# Patient Record
Sex: FEMALE | Race: WHITE
[De-identification: names, ages, dates, MRNs, and addresses within clinical notes are randomized per-mention and may not be internally consistent; named-entity substitution may affect disease eponyms.]

---

## 2017-04-02 NOTE — EDM.PDOC
ED HISTORY OF PRESENT ILLNESS





- General


Chief Complaint: Respiratory Problem


Stated Complaint: COLD/CONGESTION


Time Seen by Provider: 04/02/17 17:50


Source of Information: Reports: Patient


History Limitations: Reports: No limitations





- History of Present Illness


INITIAL COMMENTS - FREE TEXT/NARRATIVE: 


History of present illness:


[31-old female comes in with complaints of worsening cold symptoms. Indicates 

that starting Friday she became very congested had a fever as well as a 

worsening cough. Indicates last night she was kept with the cough it was 

working gave her headache and now she feels like she is having trouble with 

breathing.]





Review of systems: 


As per history of present illness and below otherwise all systems reviewed and 

negative.





Past medical history: 


As per history of present illness and as reviewed below otherwise 

noncontributory.





Surgical history: 


As per history of present illness and as reviewed below otherwise 

noncontributory.





Social history: 


No reported history of drug or alcohol abuse.





Family history: 


As per history of present illness and as reviewed below otherwise 

noncontributory.





Physical exam:


HEENT: Atraumatic, normocephalic, pupils reactive, negative for conjunctival 

pallor or scleral icterus, mucous membranes moist, throat clear, neck supple, 

nontender, trachea midline.


Lungs: Inspiratory wheezes bilaterally, generalized rhonchi noted but improves 

with the cough,  chest nontender.


Heart: S1S2, regular, negative for clicks, rubs, or JVD.


Abdomen: Soft, nondistended, nontender. Negative for masses or 

hepatosplenomegaly. Negative for costovertebral tenderness.


Pelvis: Stable nontender.


Genitourinary: Deferred.


Rectal: Deferred.


Extremities: Atraumatic, negative for cords or calf pain. Neurovascular 

unremarkable.


Neuro: Awake, alert, oriented. Cranial nerves II through XII unremarkable. 

Cerebellum unremarkable. Motor and sensory unremarkable throughout. Exam 

nonfocal.





Diagnostics:


[CBC, CMP, chest x-ray, influenza A B.]





Therapeutics:


[The fluid, Toradol, DuoNeb]





Impression: 


[Bronchitis, bacterial pharyngitis]





Plan:


[Inhaler, and zpack, ]





Definitive disposition and diagnosis as appropriate pending reevaluation and 

review of above.











- Related Data


Allergies/ADRs: 


 Allergies











Allergy/AdvReac Type Severity Reaction Status Date / Time


 


No Known Allergies Allergy   Verified 04/02/17 17:29











Home Meds: 


 Home Meds





Citalopram [Celexa] 20 mg PO DAILY 04/02/17 [History]











Past Medical History





- Past Health History


Medical/Surgical History: Denies Medical/Surgical History


HEENT History: Reports: None


Cardiovascular History: Reports: None


Respiratory History: Reports: None


Gastrointestinal History: Reports: None


Genitourinary History: Reports: None


OB/GYN History: Reports: None


Musculoskeletal History: Reports: None


Neurological History: Reports: None


Psychiatric History: Reports: Anxiety, Depression


Endocrine/Metabolic History: Reports: Obesity/BMI 30+


Hematologic History: Reports: None


Immunologic History: Reports: None


Oncologic (Cancer) History: Reports: None


Dermatologic History: Reports: None





- Infectious Disease History


Infectious Disease History: Reports: None





- Past Surgical History


Head Surgeries/Procedures: Reports: None


Cardiovascular Surgical History: Reports: None


Respiratory Surgical History: Reports: None


GI Surgical History: Reports: None


Female  Surgical History: Reports: None


Musculoskeletal Surgical History: Reports: None





Social & Family History





- Family History


Family Medical History: Noncontributory





- Tobacco Use


Smoking Status *Q: Current Every Day Smoker


Years of Tobacco use: 10


Packs/Tins Daily: 0.5


Second Hand Smoke Exposure: Yes





- Caffeine Use


Caffeine Use: Reports: Energy drinks





- Alcohol Use


Days Per Week of Alcohol Use: 2


Number of Drinks Per Day: 2


Total Drinks Per Week: 4





- Recreational Drug Use


Recreational Drug Use: No





ED ROS GENERAL





- Review of Systems


Review Of Systems: See Below (See history of present illness)





ED EXAM, GENERAL





- Physical Exam


Exam: See Below (See history of present illness)





Course





- Vital Signs


Last Recorded V/S: 


 Last Vital Signs











Temp  36.5 C   04/02/17 17:30


 


Pulse  86   04/02/17 17:30


 


Resp  16   04/02/17 17:30


 


BP  116/72   04/02/17 17:30


 


Pulse Ox  96   04/02/17 17:30














- Orders/Labs/Meds


Orders: 


 Active Orders 24 hr











 Category Date Time Status


 


 RT Aerosol Therapy [RC] ASDIRECTED Care  04/02/17 18:00 Active


 


 Chest 2V [CR] Stat Exams  04/02/17 18:08 Taken











Labs: 


 Laboratory Tests











  04/02/17 04/02/17 Range/Units





  18:22 18:22 


 


WBC  12.07 H   (4.0-11.0)  K/uL


 


RBC  4.19 L   (4.30-5.90)  M/uL


 


Hgb  13.3   (12.0-16.0)  g/dL


 


Hct  39.5   (36.0-46.0)  %


 


MCV  94.3   (80.0-98.0)  fL


 


MCH  31.7   (27.0-32.0)  pg


 


MCHC  33.7   (31.0-37.0)  g/dL


 


RDW Std Deviation  47.7   (28.0-62.0)  fl


 


RDW Coeff of Margarita  14   (11.0-15.0)  %


 


Plt Count  267   (150-400)  K/uL


 


MPV  10.10   (7.40-12.00)  fL


 


Neut % (Auto)  63.5   (48.0-80.0)  %


 


Lymph % (Auto)  26.4   (16.0-40.0)  %


 


Mono % (Auto)  6.3   (0.0-15.0)  %


 


Eos % (Auto)  3.6   (0.0-7.0)  %


 


Baso % (Auto)  0.2   (0.0-1.5)  %


 


Neut # (Auto)  7.7 H   (1.4-5.7)  K/uL


 


Lymph # (Auto)  3.2 H   (0.6-2.4)  K/uL


 


Mono # (Auto)  0.8   (0.0-0.8)  K/uL


 


Eos # (Auto)  0.4   (0.0-0.7)  K/uL


 


Baso # (Auto)  0.0   (0.0-0.1)  K/uL


 


Nucleated RBC %  0.0   /100WBC


 


Nucleated RBCs #  0   K/uL


 


Sodium   141  (136-146)  mmol/L


 


Potassium   4.1  (3.5-5.1)  mmol/L


 


Chloride   113 H  ()  mmol/L


 


Carbon Dioxide   19 L  (21-31)  mmol/L


 


BUN   15  (6.0-23.0)  mg/dL


 


Creatinine   0.7  (0.6-1.5)  mg/dL


 


Est Cr Clr Drug Dosing   123.93  mL/min


 


Estimated GFR (MDRD)   > 60.0  ml/min


 


Glucose   96  ()  mg/dL


 


Calcium   8.7 L  (8.8-10.8)  mg/dL


 


Total Bilirubin   0.2  (0.1-1.5)  mg/dL


 


AST   15  (5-40)  IU/L


 


ALT   12  (8-54)  IU/L


 


Alkaline Phosphatase   74  ()  


 


Total Protein   7.2  (6.0-8.0)  g/dL


 


Albumin   3.8  (3.5-5.0)  g/dL


 


Globulin   3.4  (2.0-3.5)  g/dL


 


Albumin/Globulin Ratio   1.1 L  (1.3-2.8)  











Meds: 


Medications














Discontinued Medications














Generic Name Dose Route Start Last Admin





  Trade Name Freq  PRN Reason Stop Dose Admin


 


Albuterol/Ipratropium  3 ml  04/02/17 18:00  04/02/17 18:08





  Duoneb 3.0-0.5 Mg/3 Ml  NEB  04/02/17 18:01  3 ml





  ONETIME ONE   Administration


 


Sodium Chloride  1,000 mls @ 999 mls/hr  04/02/17 18:00  04/02/17 18:13





  Normal Saline  IV  04/02/17 19:00  999 mls/hr





  STAT ONE   Administration


 


Ketorolac Tromethamine  30 mg  04/02/17 18:00  04/02/17 18:13





  Toradol  IVPUSH  04/02/17 18:01  Not Given





  ONETIME ONE   














Departure





- Departure


Time of Disposition: 19:25


Disposition: Home, Self-Care 01


Condition: good


Clinical Impression: 


 Bronchitis





Pharyngitis


Qualifiers:


 Pharyngitis/tonsillitis etiology: unspecified etiology Qualified Code(s): 

J02.9 - Acute pharyngitis, unspecified





Instructions:  Upper Respiratory Infection, Adult, Easy-to-Read, Acute 

Bronchitis, Easy-to-Read


Forms:  ED Department Discharge


Additional Instructions: 


The following information is given to patients seen in the emergency department 

who are being discharged to home. This information is to outline your options 

for follow-up care. We provide all patients seen in our emergency department 

with a follow-up referral.





The need for follow-up, as well as the timing and circumstances, are variable 

depending upon the specifics of your emergency department visit.





If you don't have a primary care physician on staff, we will provide you with a 

referral. We always advise you to contact your personal physician following an 

emergency department visit to inform them of the circumstance of the visit and 

for follow-up with them and/or the need for any referrals to a consulting 

specialist.





The emergency department will also refer you to a specialist when appropriate. 

This referral assures that you have the opportunity for follow-up care with a 

specialist. All of these measure are taken in an effort to provide you with 

optimal care, which includes your follow-up.





Under all circumstances we always encourage you to contact your private 

physician who remains a resource for coordinating your care. When calling for 

follow-up care, please make the office aware that this follow-up is from your 

recent emergency room visit. If for any reason you are refused follow-up, 

please contact the [facility] Emergency 

Department at [phone] and asked to speak to the emergency department 

charge nurse.





Take medication as directed








Hydrate








Followup with the primary care provider one to 2 days














Return to ED as needed as discussed





- My Orders


Last 24 Hours: 


My Active Orders





04/02/17 18:00


RT Aerosol Therapy [RC] ASDIRECTED 





04/02/17 18:08


Chest 2V [CR] Stat 














- Assessment/Plan


Last 24 Hours: 


My Active Orders





04/02/17 18:00


RT Aerosol Therapy [RC] ASDIRECTED 





04/02/17 18:08


Chest 2V [CR] Stat

## 2017-04-03 NOTE — CR
EXAM DATE: 17



PATIENT'S AGE: 31



Patient: OLIVER NYE



Facility: [place], [place]

Patient ID: [id number]

Site Patient ID: [id number].

Site Accession #: [id number]

: 1985

Study: XRay Chest [id number]/2/2017 6:50:26 PM

Ordering Physician: Doctor Temp



Final Report: 

INDICATIONS:

Cough x2 days.



TECHNIQUE:

Chest 2 view.



COMPARISON:

None



FINDINGS:

No pneumothorax, pleural effusion or airspace consolidation. Cardiac and 
mediastinal contours are within normal limits. Upper abdomen and osseous 
structures show no acute abnormality.



IMPRESSION:

No acute cardiopulmonary disease.



Dictated by Jerel Sifuentes MD @ 2017 7:08:28 PM



Dictated by: Jerel Sifuentes MD @ 2017 19:08:34

(Electronic Signature)



Report Signed by Proxy and Original Signed Document filed in the

Medical Record.
MARJORIE

## 2018-09-04 NOTE — EDM.PDOC
ED HPI GENERAL MEDICAL PROBLEM





- General


Chief Complaint: Upper Extremity Injury/Pain


Stated Complaint: LT ARM FEELS FUNNY


Time Seen by Provider: 09/04/18 17:40


Source of Information: Reports: Patient


History Limitations: Reports: No Limitations





- History of Present Illness


INITIAL COMMENTS - FREE TEXT/NARRATIVE: 


HISTORY AND PHYSICAL:





History of present illness:


Patient is a 33-year-old female who presents to the emergency room with 

complaints of numbness and tingling to her left arm. She states that over the 

past 3 days she has had intermittent episodes of numbness, tingling and "feels 

heavy". She states that this sensation feels like she has a bruise when 

palpated to her forearm. Sensation starts at the deltoid and goes down to the 

finger tips. Has had some intermitted headaches, but currently denies HA.





She denies any recent injury, trauma or falls. Patient denies any fever, chills

, change in vision, syncope or near syncope. Denies any chest pain, shortness 

of breath or cough. Denies any GI or  symptoms. She denies any repetitive arm 

motion or heavy lifting. Right hand dominant.





Review of systems: 


As per history of present illness and below otherwise all systems reviewed and 

negative.





Past medical history: 


As per history of present illness and as reviewed below otherwise 

noncontributory.





Surgical history: 


As per history of present illness and as reviewed below otherwise 

noncontributory.





Social history: 


No reported history of drug or alcohol abuse.





Family history: 


As per history of present illness and as reviewed below otherwise 

noncontributory.





Physical exam:


General: Well-developed and well-nourished 33-year-old female. Alert and 

oriented. Nontoxic appearing and in no acute distress.


HEENT: Atraumatic, normocephalic, pupils equal and reactive bilaterally, 

negative for conjunctival pallor or scleral icterus, mucous membranes moist, 

throat clear, neck supple, nontender, trachea midline. No drooling or trismus 

noted. No meningeal signs


Lungs: Clear to auscultation, breath sounds equal bilaterally, chest nontender.


Heart: S1S2, regular rate and rhythm without overt murmur


Abdomen: Soft, nondistended, nontender. Negative for masses or 

hepatosplenomegaly. Negative for costovertebral tenderness.


Pelvis: Stable nontender.


Genitourinary: Deferred.


Rectal: Deferred.


Skin: Intact, warm, dry. No lesions or rashes noted.


Extremities: Atraumatic, moves all extremities per self without difficulty or 

deficits. Equal strength and grasping to upper extremities. negative for cords 

or calf pain. Strong radial pulses bilaterally. Cap refill less than 3 seconds. 

+CMS. fully ambulatory with steady and even gait. Able to walk on heels and 

toes without difficulty. Neurovascular unremarkable. Negative Phallens sign.


C-spine/Back: No pinpoint vertebral tenderness upon palpation. No crepitus, step

-offs or obvious deformities.


Neuro: Awake, alert, oriented. Cranial nerves II through XII unremarkable. 

Denies any urinary or fecal incontinence. Cerebellum unremarkable. Motor and 

sensory unremarkable throughout. Exam nonfocal.





Notes:


GCS 15. NIH 0. Head and C-spine CT are within normal limits. Vital signs are 

stable. After talking with the patient she does describe this discomfort as a 

paresthesia but also has some muscular component to it. We did discuss muscle 

relaxer Will prescribe a Medrol dose pack and Flexeril. She is given a call Dr. Schuler's office tomorrow for follow-up. She denies any further questions or 

concerns at this time.





Diagnostics:


CT of head and C-spine





Therapeutics:


Solu-Medrol





Prescription:


Medrol Dosepak


Flexeril PRN (#15)


 


Impression: 


Paresthesia


Muscle Strain





Plan:


1. Take medication as directed.  Flexeril, muscle relaxer, may make you drowsy 

so do not take it will driving her needing to be functioning outside the house.


2. Please follow up with your primary care provider, Dr Schuler, in the next 1-2 

days. Return to the ED as needed and as discussed.





Definitive disposition and diagnosis as appropriate pending reevaluation and 

review of above.








- Related Data


 Allergies











Allergy/AdvReac Type Severity Reaction Status Date / Time


 


No Known Allergies Allergy   Verified 09/04/18 17:31











Home Meds: 


 Home Meds





Citalopram [Celexa] 20 mg PO DAILY 04/02/17 [History]


Cyclobenzaprine [Flexeril] 10 mg PO TID PRN #15 tab 09/04/18 [Rx]


methylPREDNISolone [Medrol] 1 tab PO DAILY #1 dosepk 09/04/18 [Rx]











Past Medical History





- Past Health History


Medical/Surgical History: Denies Medical/Surgical History


HEENT History: Reports: None


Cardiovascular History: Reports: None


Respiratory History: Reports: None


Gastrointestinal History: Reports: None


Genitourinary History: Reports: None


OB/GYN History: Reports: None


Musculoskeletal History: Reports: None


Neurological History: Reports: None


Psychiatric History: Reports: Anxiety, Depression


Endocrine/Metabolic History: Reports: Obesity/BMI 30+


Hematologic History: Reports: None


Immunologic History: Reports: None


Oncologic (Cancer) History: Reports: None


Dermatologic History: Reports: None





- Infectious Disease History


Infectious Disease History: Reports: None





- Past Surgical History


Head Surgeries/Procedures: Reports: None


HEENT Surgical History: Reports: None


Cardiovascular Surgical History: Reports: None


Respiratory Surgical History: Reports: None


GI Surgical History: Reports: None


Female  Surgical History: Reports: None


Musculoskeletal Surgical History: Reports: None





Social & Family History





- Family History


Family Medical History: Noncontributory





- Tobacco Use


Smoking Status *Q: Current Every Day Smoker


Years of Tobacco use: 13


Packs/Tins Daily: 1


Second Hand Smoke Exposure: Yes





- Caffeine Use


Caffeine Use: Reports: Energy Drinks





- Recreational Drug Use


Recreational Drug Use: No





Review of Systems





- Review of Systems


Review Of Systems: ROS reveals no pertinent complaints other than HPI.





ED EXAM, GENERAL





- Physical Exam


Exam: See Below (See dictation)





Course





- Vital Signs


Last Recorded V/S: 


 Last Vital Signs











Temp  97.7 F   09/04/18 19:15


 


Pulse  72   09/04/18 19:15


 


Resp  18   09/04/18 19:15


 


BP  115/72   09/04/18 19:15


 


Pulse Ox  98   09/04/18 19:15














- Orders/Labs/Meds


Orders: 


 Active Orders 24 hr











 Category Date Time Status


 


 Cervical Spine wo Cont [CT] Stat Exams  09/04/18 17:47 Taken


 


 Head wo Cont [CT] Stat Exams  09/04/18 17:47 Taken











Meds: 


Medications














Discontinued Medications














Generic Name Dose Route Start Last Admin





  Trade Name Ralphq  PRN Reason Stop Dose Admin


 


Methylprednisolone Sodium Succinate  125 mg  09/04/18 17:47  09/04/18 18:31





  Solu-Medrol  IM  09/04/18 17:48  125 mg





  ONETIME ONE   Administration





     





     





     





     














Departure





- Departure


Time of Disposition: 19:10


Disposition: Home, Self-Care 01


Clinical Impression: 


 Paresthesia of arm, Muscle strain








- Discharge Information


Prescriptions: 


Cyclobenzaprine [Flexeril] 10 mg PO TID PRN #15 tab


 PRN Reason: Muscle Spasm


methylPREDNISolone [Medrol] 1 tab PO DAILY #1 dosepk


Instructions:  Muscle Strain, Easy-to-Read


Referrals: 


PCP,None [Primary Care Provider] - 


Forms:  ED Department Discharge


Additional Instructions: 


The following information is given to patients seen in the emergency department 

who are being discharged to home. This information is to outline your options 

for follow-up care. We provide all patients seen in our emergency department 

with a follow-up referral.





The need for follow-up, as well as the timing and circumstances, are variable 

depending upon the specifics of your emergency department visit.





If you don't have a primary care physician on staff, we will provide you with a 

referral. We always advise you to contact your personal physician following an 

emergency department visit to inform them of the circumstance of the visit and 

for follow-up with them and/or the need for any referrals to a consulting 

specialist.





The emergency department will also refer you to a specialist when appropriate. 

This referral assures that you have the opportunity for follow-up care with a 

specialist. All of these measure are taken in an effort to provide you with 

optimal care, which includes your follow-up.





Under all circumstances we always encourage you to contact your private 

physician who remains a resource for coordinating your care. When calling for 

follow-up care, please make the office aware that this follow-up is from your 

recent emergency room visit. If for any reason you are refused follow-up, 

please contact the [facility] Emergency 

Department at [phone] and asked to speak to the emergency department 

charge nurse.





[facility]


Primary Care


[address] [place] [id number]


Phone: [phone]


Fax: [phone]





1. Take medication as directed.  Flexeril, muscle relaxer, may make you drowsy 

so do not take it while driving or needing to be functioning outside the house.


2. Please follow up with your primary care provider, Dr Schuler, in the next 1-2 

days. Return to the ED as needed and as discussed.





- My Orders


Last 24 Hours: 


My Active Orders





09/04/18 17:47


Cervical Spine wo Cont [CT] Stat 


Head wo Cont [CT] Stat 














- Assessment/Plan


Last 24 Hours: 


My Active Orders





09/04/18 17:47


Cervical Spine wo Cont [CT] Stat 


Head wo Cont [CT] Stat

## 2018-09-05 NOTE — CT
EXAM DATE: 18



PATIENT'S AGE: 33





Patient: OLIVER NYE



Facility: [place], [place]

Patient ID: [id number]

Site Patient ID: [id number].

Site Accession #: [id number].

: 1985

Study: CT Head [id number]/4/2018 6:37:38 PM

Ordering Physician: Doctor Temp



Final Report: 

INDICATION:

Left arm paresthesias.



TECHNIQUE:

CT head without contrast.



COMPARISON:

None. 



FINDINGS:

CSF spaces: Within normal limits for age. 



Brain parenchyma: The gray-white differentiation is normal. No sign of mass, 
hemorrhage, or midline shift. 



Skull base and calvarium: The visualized paranasal sinuses and mastoid air 
cells demonstrate no acute or significant findings. The visualized orbits are 
grossly unremarkable. No skull fractures. 



IMPRESSION:

Unremarkable noncontrast head CT.



Please note that all CT scans at this facility use dose modulation, iterative 
reconstruction, and/or weight-based dosing when appropriate to reduce radiation 
dose to as low as reasonably achievable.



Dictated by Yoav Masters MD @ Sep 4 2018 6:59PM

(Electronic Signature)







Report Signed by Proxy.
[facility]

## 2018-09-05 NOTE — CT
EXAM DATE: 18



PATIENT'S AGE: 33





Patient: OLIVER NYE



Facility: [place], [place]

Patient ID: [id number]

Site Patient ID: [id number].

Site Accession #: [id number]

: 1985

Study: CT Spine Cervical [id number]/4/2018 6:29:30 PM

Ordering Physician: Doctor Temp



Final Report: 

INDICATION:

Left arm paresthesias.



TECHNIQUE:

CT cervical spine without contrast.



COMPARISON:

None



FINDINGS:

Vertebrae: Alignment is normal. There are no fractures or suspicious bony 
lesions. 



Discs and facet joints: Disc spaces and facets are within normal limits. 



Extraspinal findings: Prevertebral soft tissues, visualized airway, and 
visualized lungs are unremarkable. Multiple bilateral mildly prominent lymph 
nodes are present. 



IMPRESSION:

Unremarkable cervical spine CT. No findings to explain left arm paresthesias. 
Mild bilateral neck lymphadenopathy is nonspecific.



Please note that all CT scans at this facility use dose modulation, iterative 
reconstruction, and/or weight-based dosing when appropriate to reduce radiation 
dose to as low as reasonably achievable.



Dictated by Yoav Masters MD @ Sep 4 2018 6:53PM

(Electronic Signature)







Report Signed by Proxy.
[facility]

## 2019-03-12 ENCOUNTER — HOSPITAL ENCOUNTER (EMERGENCY)
Dept: HOSPITAL 56 - MW.ED | Age: 34
Discharge: HOME | End: 2019-03-12
Payer: COMMERCIAL

## 2019-03-12 VITALS — DIASTOLIC BLOOD PRESSURE: 73 MMHG | SYSTOLIC BLOOD PRESSURE: 109 MMHG
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
## 2019-03-12 DIAGNOSIS — L03.312: ICD-10-CM

## 2019-03-12 DIAGNOSIS — S31.000A: Primary | ICD-10-CM

## 2019-03-12 DIAGNOSIS — Z23: ICD-10-CM

## 2019-03-12 NOTE — EDM.PDOC
ED HPI GENERAL MEDICAL PROBLEM





- General


Chief Complaint: Skin Complaint


Stated Complaint: PER PT. SHE HAS SOMETHING ON HER BACK


Time Seen by Provider: 03/12/19 01:14





- History of Present Illness


INITIAL COMMENTS - FREE TEXT/NARRATIVE: 





HISTORY AND PHYSICAL:





History of present illness:


The patient is a healthy 33-year-old female who denies pregnancy and has no 

significant past medical history that she describes to me and presents with 

several days of a wound like area at her left lower back area and she noticed 

some drainage from it and pain and she thought it was looking worse since she 

came for evaluation. The patient does not recall any specific trauma to the 

area or bite and was concerned about infection. She has no systemic complaints 

such as fever chills nausea vomiting or diarrhea. She denies lesions similar to 

this elsewhere on her body.





Review of systems: 


As per history of present illness and below otherwise all systems reviewed and 

negative.





Past medical history: 


As per history of present illness and as reviewed below otherwise 

noncontributory.





Surgical history: 


As per history of present illness and as reviewed below otherwise 

noncontributory.





Social history: 


No reported history of drug or alcohol abuse.





Family history: 


As per history of present illness and as reviewed below otherwise 

noncontributory.





Physical exam:


General: Well-developed well-nourished overweight female who is nontoxic and 

vital signs are reviewed by me


HEENT: Atraumatic, normocephalic,, negative for conjunctival pallor or scleral 

icterus, mucous membranes moist, throat clear, neck supple, nontender, trachea 

midline.


Lungs: Clear to auscultation, breath sounds equal bilaterally, chest nontender.


Heart: S1S2, regular rate and rhythm no overt murmurs


Abdomen: Soft, nondistended, nontender. 


Pelvis: Stable nontender. At the left lumbar spine area there is a circular 

wound measuring 2 x 2 centimeters which has a superficial scab on it and there 

is surrounding erythema with a total measurement of 3.5 x 2.5. There is no 

drainage seen no fluctuance and no soft tissue swelling and there are no other 

lesions seen similar to this in the region.


Genitourinary: Deferred.


Rectal: Deferred.


Extremities: Atraumatic, full range of motion Neurovascular unremarkable.


Neuro: Awake, alert, oriented. Cranial nerves II through XII unremarkable. 

Cerebellum unremarkable. Motor and sensory unremarkable throughout. Exam 

nonfocal.





Diagnostics:








Therapeutics:


Tdap, local wound care with bacitracin and dressing





Impression: 


Lumbar back wound with localized cellulitis





Definitive disposition and diagnosis as appropriate pending reevaluation and 

review of above.


  ** Left Upper Buttock


Pain Score (Numeric/FACES): 0





- Related Data


 Allergies











Allergy/AdvReac Type Severity Reaction Status Date / Time


 


No Known Allergies Allergy   Verified 03/12/19 01:19











Home Meds: 


 Home Meds





Citalopram [Celexa] 20 mg PO DAILY 04/02/17 [History]











Past Medical History





- Past Health History


Medical/Surgical History: Denies Medical/Surgical History


HEENT History: Reports: None


Cardiovascular History: Reports: None


Respiratory History: Reports: None


Gastrointestinal History: Reports: None


Genitourinary History: Reports: None


OB/GYN History: Reports: None


Musculoskeletal History: Reports: None


Neurological History: Reports: None


Psychiatric History: Reports: Anxiety, Depression


Endocrine/Metabolic History: Reports: Obesity/BMI 30+


Hematologic History: Reports: None


Immunologic History: Reports: None


Oncologic (Cancer) History: Reports: None


Dermatologic History: Reports: None





- Infectious Disease History


Infectious Disease History: Reports: None





- Past Surgical History


Head Surgeries/Procedures: Reports: None


HEENT Surgical History: Reports: None


Cardiovascular Surgical History: Reports: None


Respiratory Surgical History: Reports: None


GI Surgical History: Reports: None


Female  Surgical History: Reports: None


Musculoskeletal Surgical History: Reports: None





Social & Family History





- Family History


Family Medical History: Noncontributory





- Caffeine Use


Caffeine Use: Reports: Energy Drinks





ED ROS GENERAL





- Review of Systems


Review Of Systems: ROS reveals no pertinent complaints other than HPI.





ED EXAM, SKIN/RASH


Exam: See Below (See dictation)





Course





- Vital Signs


Last Recorded V/S: 


 Last Vital Signs











Temp  36.3 C   03/12/19 01:17


 


Pulse  80   03/12/19 01:17


 


Resp  18   03/12/19 01:17


 


BP  119/81   03/12/19 01:17


 


Pulse Ox  97   03/12/19 01:17














- Orders/Labs/Meds


Orders: 


 Active Orders 24 hr











 Category Date Time Status


 


 Vaccines to be Administered [RC] PER UNIT ROUTINE Care  03/12/19 01:25 Ordered











Meds: 


Medications














Discontinued Medications














Generic Name Dose Route Start Last Admin





  Trade Name Freq  PRN Reason Stop Dose Admin


 


Bacitracin  1 dose  03/12/19 01:25  





  Bacitracin Oint 1 Gm  TOP  03/12/19 01:26  





  ONETIME ONE   





     





     





     





     


 


Diphtheria/Tetanus/Acell Pertussis  0.5 ml  03/12/19 01:25  





  Adacel  IM  03/12/19 01:26  





  .ONCE ONE   





     





     





     





     














Departure





- Departure


Time of Disposition: 01:29


Disposition: Home, Self-Care 01


Condition: Good


Clinical Impression: 


Cellulitis


Qualifiers:


 Site of cellulitis: other site Qualified Code(s): L03.818 - Cellulitis of 

other sites








- Discharge Information


Referrals: 


PCP,None [Primary Care Provider] - 


Forms:  ED Department Discharge


Additional Instructions: 


The following information is given to patients seen in the emergency department 

who are being discharged to home. This information is to outline your options 

for follow-up care. We provide all patients seen in our emergency department 

with a follow-up referral.





The need for follow-up, as well as the timing and circumstances, are variable 

depending upon the specifics of your emergency department visit.





If you don't have a primary care physician on staff, we will provide you with a 

referral. We always advise you to contact your personal physician following an 

emergency department visit to inform them of the circumstance of the visit and 

for follow-up with them and/or the need for any referrals to a consulting 

specialist.





The emergency department will also refer you to a specialist when appropriate. 

This referral assures that you have the opportunity for followup care with a 

specialist. All of these measure are taken in an effort to provide you with 

optimal care, which includes your followup.





Under all circumstances we always encourage you to contact your private 

physician who remains a resource for coordinating  your care. When calling for 

followup care, please make the office aware that this follow-up is from your 

recent emergency room visit. If for any reason you are refused follow-up, 

please contact the [facility] emergency 

department at [phone] and ask to speak to the emergency department 

charge nurse.





[facility] 


Primary care- Internal Medicine and Family [address] [address]


[phone]








Please take antibiotics as you have been prescribed from [facility], Bactrim, 

until they are finished. Use over-the-counter Tylenol or ibuprofen for pain. 

Keep the area clean and dry using mild soap and water patting dry and applying 

bacitracin or Neosporin. These do not squeeze a manipulate the area. Follow-up 

in our clinic or with your provider for reevaluation and further care and 

return to ER as needed and as discussed





- My Orders


Last 24 Hours: 


My Active Orders





03/12/19 01:25


Vaccines to be Administered [RC] PER UNIT ROUTINE 














- Assessment/Plan


Last 24 Hours: 


My Active Orders





03/12/19 01:25


Vaccines to be Administered [RC] PER UNIT ROUTINE

## 2019-09-16 ENCOUNTER — HOSPITAL ENCOUNTER (EMERGENCY)
Dept: HOSPITAL 56 - MW.ED | Age: 34
Discharge: HOME | End: 2019-09-16
Payer: COMMERCIAL

## 2019-09-16 DIAGNOSIS — F17.210: ICD-10-CM

## 2019-09-16 DIAGNOSIS — F41.9: ICD-10-CM

## 2019-09-16 DIAGNOSIS — F32.9: ICD-10-CM

## 2019-09-16 DIAGNOSIS — Z79.899: ICD-10-CM

## 2019-09-16 DIAGNOSIS — R07.81: Primary | ICD-10-CM

## 2019-09-16 DIAGNOSIS — R10.9: ICD-10-CM

## 2019-09-16 LAB
BUN SERPL-MCNC: 14 MG/DL (ref 7–18)
CHLORIDE SERPL-SCNC: 101 MMOL/L (ref 98–107)
CO2 SERPL-SCNC: 22.9 MMOL/L (ref 21–32)
GLUCOSE SERPL-MCNC: 109 MG/DL (ref 74–106)
LIPASE SERPL-CCNC: 126 U/L (ref 73–393)
POTASSIUM SERPL-SCNC: 4.2 MMOL/L (ref 3.5–5.1)
SODIUM SERPL-SCNC: 137 MMOL/L (ref 136–145)

## 2019-09-16 PROCEDURE — 96375 TX/PRO/DX INJ NEW DRUG ADDON: CPT

## 2019-09-16 PROCEDURE — 83690 ASSAY OF LIPASE: CPT

## 2019-09-16 PROCEDURE — 96374 THER/PROPH/DIAG INJ IV PUSH: CPT

## 2019-09-16 PROCEDURE — 81003 URINALYSIS AUTO W/O SCOPE: CPT

## 2019-09-16 PROCEDURE — 36415 COLL VENOUS BLD VENIPUNCTURE: CPT

## 2019-09-16 PROCEDURE — 93005 ELECTROCARDIOGRAM TRACING: CPT

## 2019-09-16 PROCEDURE — 96361 HYDRATE IV INFUSION ADD-ON: CPT

## 2019-09-16 PROCEDURE — 80053 COMPREHEN METABOLIC PANEL: CPT

## 2019-09-16 PROCEDURE — 85025 COMPLETE CBC W/AUTO DIFF WBC: CPT

## 2019-09-16 PROCEDURE — 99284 EMERGENCY DEPT VISIT MOD MDM: CPT

## 2019-09-16 PROCEDURE — 74176 CT ABD & PELVIS W/O CONTRAST: CPT

## 2019-09-16 PROCEDURE — 81025 URINE PREGNANCY TEST: CPT

## 2019-09-16 NOTE — EDM.PDOC
ED HPI GENERAL MEDICAL PROBLEM





- General


Chief Complaint: Back Pain or Injury


Stated Complaint: PT HURT BACK


Time Seen by Provider: 09/16/19 21:13


Source of Information: Reports: Patient


History Limitations: Reports: No Limitations





- History of Present Illness


INITIAL COMMENTS - FREE TEXT/NARRATIVE: 


HISTORY AND PHYSICAL:





History of present illness:


Patient is a 34-year-old female presents to the ED today with concern of right-

sided flank pain since 1 this afternoon. Patient states she was on her way to 

work when she has felt a sharp pain in her right side, and alongside this had 

nausea and felt like she was going to vomit but has not thrown up. Patient 

states the pain has consisted throughout her work shift but is not as severe as 

when it first came on. Patient rates her discomfort a 6 out of 10 and states is 

worse with movement and that it sharp in the right side and radiates to the 

front and into the right side of her abdomen. Patient denies any health history 

or any abdominal surgeries. Patient states she is sexually active and not on 

birth control.





Patient denies fever, chills, chest pain, shortness of breath, or cough. Denies 

headache, neck stiff ness, change in vision, syncope, or near syncope. Denies 

vomiting, diarrhea, constipation, or dysuria. Has not noted any blood in urine 

or stool. Patient has been eating and drinking appropriately.





Review of systems: 


As per history of present illness and below otherwise all systems reviewed and 

negative.





Past medical history: 


As per history of present illness and as reviewed below otherwise 

noncontributory.





Surgical history: 


As per history of present illness and as reviewed below otherwise 

noncontributory.





Social history: 


See social history for further information





Family history: 


As per history of present illness and as reviewed below otherwise 

noncontributory.





Physical exam:


General: Patient is alert, oriented, and in no acute distress. Patient sitting 

comfortably on exam table but does appear uncomfortable with movement.


HEENT: Atraumatic, normocephalic, pupils equal and reactive bilaterally, 

negative for conjunctival pallor or scleral icterus, mucous membranes moist, 

TMs normal bilaterally, throat clear, neck supple, nontender, trachea midline. 

No drooling or trismus noted. No meningeal signs. No hot potato voice noted. 


Lungs: Clear to auscultation, breath sounds equal bilaterally. Mild pain with 

palpation over ribs 10-12 of the right flank area.


Heart: S1S2, regular rate and rhythm without overt murmur


Abdomen: Soft, nondistended. Mild pain with palpation of the right sided 

abdomen. Negative for masses or hepatosplenomegaly. Negative for costovertebral 

tenderness.


Pelvis: Stable nontender.


Genitourinary: Deferred.


Rectal: Deferred.


Skin: Intact, warm, dry. No lesions or rashes noted.


Extremities: Atraumatic, negative for cords or calf pain. Neurovascular 

unremarkable.


Neuro: Awake, alert, oriented. Cranial nerves II through XII unremarkable. 

Cerebellum unremarkable. Motor and sensory unremarkable throughout. Exam 

nonfocal.





Notes:


Discussed the importance for follow-up with a primary care provider.


Voices understanding and is agreeable to plan of care. Denies any further 

questions or concerns at this time.





Diagnostics:


CBC, CMP, UA, urine hCG lipase, EKG, lipase, abdominal pelvic CT with contrast, 

(Patient declines CXR)





Therapeutics:


Saline, Zofran, Toradol





Prescription:


Diclofenac





Impression: 


Right flank/rib pain, unspecified





Plan:


1. Take medication as prescribed. You can also use Tylenol as directed for pain 

and discomfort.


2. Follow-up with your primary care provider as discussed. Return to the ED as 

needed and as discussed.





Definitive disposition and diagnosis as appropriate pending reevaluation and 

review of above.





  ** Right Back


Pain Score (Numeric/FACES): 5





- Related Data


 Allergies











Allergy/AdvReac Type Severity Reaction Status Date / Time


 


No Known Allergies Allergy   Verified 03/12/19 01:19











Home Meds: 


 Home Meds





Citalopram [Celexa] 10 mg PO DAILY 04/02/17 [History]











Past Medical History





- Past Health History


Medical/Surgical History: Denies Medical/Surgical History


HEENT History: Reports: None


Cardiovascular History: Reports: None


Respiratory History: Reports: None


Gastrointestinal History: Reports: None


Genitourinary History: Reports: None


OB/GYN History: Reports: None


Musculoskeletal History: Reports: None


Neurological History: Reports: None


Psychiatric History: Reports: Anxiety, Depression


Endocrine/Metabolic History: Reports: Obesity/BMI 30+


Hematologic History: Reports: None


Immunologic History: Reports: None


Oncologic (Cancer) History: Reports: None


Dermatologic History: Reports: None





- Infectious Disease History


Infectious Disease History: Reports: None





- Past Surgical History


Head Surgeries/Procedures: Reports: None


HEENT Surgical History: Reports: None


Cardiovascular Surgical History: Reports: None


Respiratory Surgical History: Reports: None


GI Surgical History: Reports: None


Female  Surgical History: Reports: None


Musculoskeletal Surgical History: Reports: None





Social & Family History





- Family History


Family Medical History: Noncontributory





- Tobacco Use


Smoking Status *Q: Current Every Day Smoker


Years of Tobacco use: 14


Packs/Tins Daily: 1





- Caffeine Use


Caffeine Use: Reports: None





- Alcohol Use


Days Per Week of Alcohol Use: 7


Number of Drinks Per Day: 5


Total Drinks Per Week: 35





- Recreational Drug Use


Recreational Drug Use: No





ED ROS GENERAL





- Review of Systems


Review Of Systems: ROS reveals no pertinent complaints other than HPI.





ED EXAM, GENERAL





- Physical Exam


Exam: See Below (See dictation)





Course





- Vital Signs


Last Recorded V/S: 


 Last Vital Signs











Temp  36.2 C   09/16/19 21:33


 


Pulse  93   09/16/19 21:33


 


Resp  16   09/16/19 21:33


 


BP  125/85   09/16/19 21:33


 


Pulse Ox  93 L  09/16/19 21:33














- Orders/Labs/Meds


Orders: 


 Active Orders 24 hr











 Category Date Time Status


 


 EKG Documentation Completion [RC] STAT Care  09/16/19 22:21 Active











Labs: 


 Laboratory Tests











  09/16/19 09/16/19 09/16/19 Range/Units





  21:44 21:44 22:10 


 


WBC    12.10 H  (4.0-11.0)  K/uL


 


RBC    4.35  (4.30-5.90)  M/uL


 


Hgb    14.8  (12.0-16.0)  g/dL


 


Hct    42.7  (36.0-46.0)  %


 


MCV    98.2 H  (80.0-98.0)  fL


 


MCH    34.0 H  (27.0-32.0)  pg


 


MCHC    34.7  (31.0-37.0)  g/dL


 


RDW Std Deviation    45.5  (28.0-62.0)  fl


 


RDW Coeff of Margarita    13  (11.0-15.0)  %


 


Plt Count    298  (150-400)  K/uL


 


MPV    10.20  (7.40-12.00)  fL


 


Neut % (Auto)    64.9  (48.0-80.0)  %


 


Lymph % (Auto)    25.5  (16.0-40.0)  %


 


Mono % (Auto)    7.2  (0.0-15.0)  %


 


Eos % (Auto)    2.2  (0.0-7.0)  %


 


Baso % (Auto)    0.2  (0.0-1.5)  %


 


Neut # (Auto)    7.8 H  (1.4-5.7)  K/uL


 


Lymph # (Auto)    3.1 H  (0.6-2.4)  K/uL


 


Mono # (Auto)    0.9 H  (0.0-0.8)  K/uL


 


Eos # (Auto)    0.3  (0.0-0.7)  K/uL


 


Baso # (Auto)    0.0  (0.0-0.1)  K/uL


 


Nucleated RBC %    0.0  /100WBC


 


Nucleated RBCs #    0  K/uL


 


Sodium     (136-145)  mmol/L


 


Potassium     (3.5-5.1)  mmol/L


 


Chloride     ()  mmol/L


 


Carbon Dioxide     (21.0-32.0)  mmol/L


 


BUN     (7.0-18.0)  mg/dL


 


Creatinine     (0.6-1.0)  mg/dL


 


Est Cr Clr Drug Dosing     mL/min


 


Estimated GFR (MDRD)     ml/min


 


Glucose     ()  mg/dL


 


Calcium     (8.5-10.1)  mg/dL


 


Total Bilirubin     (0.2-1.0)  mg/dL


 


AST     (15-37)  IU/L


 


ALT     (14-63)  IU/L


 


Alkaline Phosphatase     ()  U/L


 


Total Protein     (6.4-8.2)  g/dL


 


Albumin     (3.4-5.0)  g/dL


 


Globulin     (2.6-4.0)  g/dL


 


Albumin/Globulin Ratio     (0.9-1.6)  


 


Lipase     ()  U/L


 


Urine Color  YELLOW    


 


Urine Appearance  CLEAR    


 


Urine pH  6.0    (5.0-8.0)  


 


Ur Specific Gravity  <= 1.005    (1.001-1.035)  


 


Urine Protein  NEGATIVE    (NEGATIVE)  mg/dL


 


Urine Glucose (UA)  NEGATIVE    (NEGATIVE)  mg/dL


 


Urine Ketones  NEGATIVE    (NEGATIVE)  mg/dL


 


Urine Occult Blood  NEGATIVE    (NEGATIVE)  


 


Urine Nitrite  NEGATIVE    (NEGATIVE)  


 


Urine Bilirubin  NEGATIVE    (NEGATIVE)  


 


Urine Urobilinogen  0.2    (<2.0)  EU/dL


 


Ur Leukocyte Esterase  NEGATIVE    (NEGATIVE)  


 


Urine HCG, Qual   NEGATIVE   (NEGATIVE)  














  09/16/19 Range/Units





  22:10 


 


WBC   (4.0-11.0)  K/uL


 


RBC   (4.30-5.90)  M/uL


 


Hgb   (12.0-16.0)  g/dL


 


Hct   (36.0-46.0)  %


 


MCV   (80.0-98.0)  fL


 


MCH   (27.0-32.0)  pg


 


MCHC   (31.0-37.0)  g/dL


 


RDW Std Deviation   (28.0-62.0)  fl


 


RDW Coeff of Margarita   (11.0-15.0)  %


 


Plt Count   (150-400)  K/uL


 


MPV   (7.40-12.00)  fL


 


Neut % (Auto)   (48.0-80.0)  %


 


Lymph % (Auto)   (16.0-40.0)  %


 


Mono % (Auto)   (0.0-15.0)  %


 


Eos % (Auto)   (0.0-7.0)  %


 


Baso % (Auto)   (0.0-1.5)  %


 


Neut # (Auto)   (1.4-5.7)  K/uL


 


Lymph # (Auto)   (0.6-2.4)  K/uL


 


Mono # (Auto)   (0.0-0.8)  K/uL


 


Eos # (Auto)   (0.0-0.7)  K/uL


 


Baso # (Auto)   (0.0-0.1)  K/uL


 


Nucleated RBC %   /100WBC


 


Nucleated RBCs #   K/uL


 


Sodium  137  (136-145)  mmol/L


 


Potassium  4.2  (3.5-5.1)  mmol/L


 


Chloride  101  ()  mmol/L


 


Carbon Dioxide  22.9  (21.0-32.0)  mmol/L


 


BUN  14  (7.0-18.0)  mg/dL


 


Creatinine  0.8  (0.6-1.0)  mg/dL


 


Est Cr Clr Drug Dosing  105.51  mL/min


 


Estimated GFR (MDRD)  > 60.0  ml/min


 


Glucose  109 H  ()  mg/dL


 


Calcium  9.8  (8.5-10.1)  mg/dL


 


Total Bilirubin  0.2  (0.2-1.0)  mg/dL


 


AST  17  (15-37)  IU/L


 


ALT  20  (14-63)  IU/L


 


Alkaline Phosphatase  94  ()  U/L


 


Total Protein  8.4 H  (6.4-8.2)  g/dL


 


Albumin  3.9  (3.4-5.0)  g/dL


 


Globulin  4.5 H  (2.6-4.0)  g/dL


 


Albumin/Globulin Ratio  0.9  (0.9-1.6)  


 


Lipase  126  ()  U/L


 


Urine Color   


 


Urine Appearance   


 


Urine pH   (5.0-8.0)  


 


Ur Specific Gravity   (1.001-1.035)  


 


Urine Protein   (NEGATIVE)  mg/dL


 


Urine Glucose (UA)   (NEGATIVE)  mg/dL


 


Urine Ketones   (NEGATIVE)  mg/dL


 


Urine Occult Blood   (NEGATIVE)  


 


Urine Nitrite   (NEGATIVE)  


 


Urine Bilirubin   (NEGATIVE)  


 


Urine Urobilinogen   (<2.0)  EU/dL


 


Ur Leukocyte Esterase   (NEGATIVE)  


 


Urine HCG, Qual   (NEGATIVE)  











Meds: 


Medications














Discontinued Medications














Generic Name Dose Route Start Last Admin





  Trade Name Freq  PRN Reason Stop Dose Admin


 


Sodium Chloride  1,000 mls @ 999 mls/hr  09/16/19 21:45  09/16/19 22:15





  Normal Saline  IV  09/16/19 22:45  999 mls/hr





  BOLUS ONE   Administration





     





     





     





     


 


Ketorolac Tromethamine  30 mg  09/16/19 22:15  09/16/19 22:40





  Toradol  IVPUSH  09/16/19 22:16  30 mg





  ONETIME ONE   Administration





     





     





     





     


 


Ondansetron HCl  4 mg  09/16/19 22:15  09/16/19 22:39





  Zofran  IVPUSH  09/16/19 22:16  4 mg





  ONETIME ONE   Administration





     





     





     





     














Departure





- Departure


Time of Disposition: 23:14


Disposition: Home, Self-Care 01


Clinical Impression: 


 Rib pain, Flank pain








- Discharge Information


Referrals: 


Kvng Schuler MD [Primary Care Provider] - 


Forms:  ED Department Discharge


Additional Instructions: 


The following information is given to patients seen in the emergency department 

who are being discharged to home. This information is to outline your options 

for follow-up care. We provide all patients seen in our emergency department 

with a follow-up referral.





The need for follow-up, as well as the timing and circumstances, are variable 

depending upon the specifics of your emergency department visit.





If you don't have a primary care physician on staff, we will provide you with a 

referral. We always advise you to contact your personal physician following an 

emergency department visit to inform them of the circumstance of the visit and 

for follow-up with them and/or the need for any referrals to a consulting 

specialist.





The emergency department will also refer you to a specialist when appropriate. 

This referral assures that you have the opportunity for follow-up care with a 

specialist. All of these measure are taken in an effort to provide you with 

optimal care, which includes your follow-up.





Under all circumstances we always encourage you to contact your private 

physician who remains a resource for coordinating your care. When calling for 

follow-up care, please make the office aware that this follow-up is from your 

recent emergency room visit. If for any reason you are refused follow-up, 

please contact the [facility] Emergency 

Department at [phone] and asked to speak to the emergency department 

charge nurse.





[facility]


Primary Care


[address] [place] [id number]


Phone: [phone]


Fax: [phone]





[facility]


[address] [place] [address]


Phone: [phone]


Fax: [phone]





1. Take medication as prescribed. You can also use Tylenol as directed for pain 

and discomfort.


2. Follow-up with your primary care provider as discussed. Return to the ED as 

needed and as discussed.





 








- My Orders


Last 24 Hours: 


My Active Orders





09/16/19 22:21


EKG Documentation Completion [RC] STAT 














- Assessment/Plan


Last 24 Hours: 


My Active Orders





09/16/19 22:21


EKG Documentation Completion [RC] STAT

## 2019-09-16 NOTE — CT
INDICATION:



Right-sided flank pain. 



COMPARISON:



None available 



TECHNIQUE:



CT examination of the abdomen and pelvis was performed without contrast 

enhancement using 3 mm thick axial sections from the lung bases through the 

pubic symphysis. Oral contrast was not administered. 



Please note that all CT scans at this facility use dose modulation, 

iterative reconstruction, and/or weight-based dosing when appropriate to 

reduce radiation dose to as low as reasonably achievable. 



FINDINGS:



In the abdomen, the unenhanced liver, spleen, pancreas, and adrenals are 

normal in appearance. 



The unenhanced kidneys are normal in appearance. 



The gallbladder is normal in appearance. 



The abdominal aorta is normal in caliber with no sign of dilatation. There 

is no sign of retroperitoneal mass or adenopathy. 



The stomach, loops of small bowel, and colon in the abdomen are normal in 

appearance. 



In the pelvis, the appendix is normal in appearance with no sign of 

inflammatory process. 



There is minimal sigmoid diverticulosis without evidence of diverticulitis. 

The loops of small bowel and colon in the pelvis are otherwise normal in 

appearance.



The uterus and adnexal regions are normal in appearance. 



The urinary bladder is normal in appearance. 



There is no sign of pelvic or inguinal mass or adenopathy. 



The lung bases are clear.



There is moderate anterior angulation of the distal 2 coccygeal segments 

consistent with an old, healed coccygeal fracture. 



The osseous structures are otherwise normal in appearance for the patient`s 

age.



IMPRESSION:



Nothing seen to explain the patient`s right flank pain. Normal appearance 

of the right urinary system with no sign of calculus or obstruction. Normal 

appearance of the appendix. Normal appearance of the right adnexal region. 



Normal CT of the abdomen without contrast.



Normal CT of the pelvis without contrast except for an old, healed 

coccygeal fracture.



Please note that all CT scans at this facility use dose modulation, 

iterative reconstruction, and/or weight-based dosing when appropriate to 

reduce radiation dose to as low as reasonably achievable.



Dictated by Michael Alba MD @ Sep 16 2019 11:04PM



Signed by Dr. Michael Alba @ Sep 16 2019 11:09PM

## 2019-09-17 VITALS — SYSTOLIC BLOOD PRESSURE: 119 MMHG | HEART RATE: 86 BPM | DIASTOLIC BLOOD PRESSURE: 80 MMHG

## 2019-10-27 ENCOUNTER — HOSPITAL ENCOUNTER (EMERGENCY)
Dept: HOSPITAL 56 - MW.ED | Age: 34
Discharge: HOME | End: 2019-10-27
Payer: COMMERCIAL

## 2019-10-27 VITALS — SYSTOLIC BLOOD PRESSURE: 128 MMHG | DIASTOLIC BLOOD PRESSURE: 81 MMHG | HEART RATE: 94 BPM

## 2019-10-27 DIAGNOSIS — E66.9: ICD-10-CM

## 2019-10-27 DIAGNOSIS — F32.9: ICD-10-CM

## 2019-10-27 DIAGNOSIS — S89.91XA: Primary | ICD-10-CM

## 2019-10-27 DIAGNOSIS — F41.9: ICD-10-CM
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
## 2019-10-27 DIAGNOSIS — Z79.899: ICD-10-CM

## 2019-10-27 NOTE — EDM.PDOC
ED HPI GENERAL MEDICAL PROBLEM





- General


Chief Complaint: Lower Extremity Injury/Pain


Stated Complaint: RIGHT KNEE IN PAIN


Time Seen by Provider: 10/27/19 19:23





- History of Present Illness


INITIAL COMMENTS - FREE TEXT/NARRATIVE: 


HISTORY AND PHYSICAL:





History of present illness:


Patient 34-year-old female presents with a concern of acute right knee injury 

when she fell yesterday hitting her right knee she denies other trauma or 

concern





Review of systems: 


As per history of present illness and below otherwise all systems reviewed and 

negative.





Past medical history: 


As per history of present illness and as reviewed below otherwise 

noncontributory.





Surgical history: 


As per history of present illness and as reviewed below otherwise 

noncontributory.





Social history: 


No reported history of drug or alcohol abuse.





Family history: 


As per history of present illness and as reviewed below otherwise 

noncontributory.





Physical exam:


HEENT: Atraumatic, normocephalic, pupils reactive, negative for conjunctival 

pallor or scleral icterus, mucous membranes moist, throat clear, neck supple, 

nontender, trachea midline.


Lungs: Clear to auscultation, breath sounds equal bilaterally, chest nontender.


Heart: S1S2, regular, negative for clicks, rubs, or JVD.


Abdomen: Soft, nondistended, nontender. Negative for masses or 

hepatosplenomegaly. Negative for costovertebral tenderness.


Pelvis: Stable nontender.


Genitourinary: Deferred.


Rectal: Deferred.


Extremities: Tenderness noted in the region of the medial aspect of her right 

knee there is no crepitation or point tenderness CMS and neurovascular exams 

unremarkable is no effusion noted


Neuro: Awake, alert, oriented. Cranial nerves II through XII unremarkable. 

Cerebellum unremarkable. Motor and sensory unremarkable throughout. Exam 

nonfocal.





Diagnostics:


X-ray right knee





Therapeutics:


Ace wrap





Impression: 


#1 acute right knee injury (blunt force trauma)





Definitive disposition and diagnosis as appropriate pending reevaluation and 

review of above.








- Related Data


 Allergies











Allergy/AdvReac Type Severity Reaction Status Date / Time


 


No Known Allergies Allergy   Verified 10/27/19 19:26











Home Meds: 


 Home Meds





Citalopram [Celexa] 10 mg PO DAILY 04/02/17 [History]











Past Medical History





- Past Health History


Medical/Surgical History: Denies Medical/Surgical History


HEENT History: Reports: None


Cardiovascular History: Reports: None


Respiratory History: Reports: None


Gastrointestinal History: Reports: None


Genitourinary History: Reports: None


OB/GYN History: Reports: None


Musculoskeletal History: Reports: None


Neurological History: Reports: None


Psychiatric History: Reports: Anxiety, Depression


Endocrine/Metabolic History: Reports: Obesity/BMI 30+


Hematologic History: Reports: None


Immunologic History: Reports: None


Oncologic (Cancer) History: Reports: None


Dermatologic History: Reports: None





- Infectious Disease History


Infectious Disease History: Reports: None





- Past Surgical History


Head Surgeries/Procedures: Reports: None


HEENT Surgical History: Reports: None


Cardiovascular Surgical History: Reports: None


Respiratory Surgical History: Reports: None


GI Surgical History: Reports: None


Female  Surgical History: Reports: None


Musculoskeletal Surgical History: Reports: None





Social & Family History





- Family History


Family Medical History: Noncontributory





- Caffeine Use


Caffeine Use: Reports: None





Review of Systems





- Review of Systems


Review Of Systems: ROS reveals no pertinent complaints other than HPI.





ED EXAM, GENERAL





- Physical Exam


Exam: See Below (dictation)





Course





- Vital Signs


Last Recorded V/S: 


 Last Vital Signs











Temp  36.2 C   10/27/19 19:24


 


Pulse  116 H  10/27/19 19:24


 


Resp  18   10/27/19 19:24


 


BP  118/87   10/27/19 19:24


 


Pulse Ox  95   10/27/19 19:24














- Orders/Labs/Meds


Orders: 


 Active Orders 24 hr











 Category Date Time Status


 


 Knee 3V Rt [CR] Stat Exams  10/27/19 19:25 Taken














Departure





- Departure


Time of Disposition: 20:06


Disposition: Home, Self-Care 01


Condition: Good


Clinical Impression: 


 Knee injury








- Discharge Information


Forms:  ED Department Discharge


Additional Instructions: 


The following information is given to patients seen in the emergency department 

who are being discharged to home. This information is to outline your options 

for follow-up care. We provide all patients seen in our emergency department 

with a follow-up referral.





The need for follow-up, as well as the timing and circumstances, are variable 

depending upon the specifics of your emergency department visit.





If you don't have a primary care physician on staff, we will provide you with a 

referral. We always advise you to contact your personal physician following an 

emergency department visit to inform them of the circumstance of the visit and 

for follow-up with them and/or the need for any referrals to a consulting 

specialist.





The emergency department will also refer you to a specialist when appropriate. 

This referral assures that you have the opportunity for followup care with a 

specialist. All of these measure are taken in an effort to provide you with 

optimal care, which includes your followup.





Under all circumstances we always encourage you to contact your private 

physician who remains a resource for coordinating  your care. When calling for 

followup care, please make the office aware that this follow-up is from your 

recent emergency room visit. If for any reason you are refused follow-up, 

please contact the [facility] emergency department at [phone] 

and asked to speak to the emergency department charge nurse.























Motrin/Tylenol as directed follow-up primary medical doctor return as needed as 

discussed





- My Orders


Last 24 Hours: 


My Active Orders





10/27/19 19:25


Knee 3V Rt [CR] Stat 














- Assessment/Plan


Last 24 Hours: 


My Active Orders





10/27/19 19:25


Knee 3V Rt [CR] Stat

## 2019-10-27 NOTE — CR
Indication:



Injury and pain.



Technique:



Right knee 3 views 



Comparison:



None



Findings:



Bones: Alignment is normal. No fractures or bone lesions.  



Joint spaces: No joint effusion. Joint spaces are well maintained. No 

degenerative changes.  



Soft tissues: Unremarkable.  



Impression:



No sign of acute injury.



Dictated by Yoav Masters MD @ Oct 27 2019  8:12PM



Signed by Dr. Yoav Masters @ Oct 27 2019  8:13PM

## 2022-06-11 ENCOUNTER — HOSPITAL ENCOUNTER (EMERGENCY)
Dept: HOSPITAL 56 - MW.ED | Age: 37
LOS: 1 days | Discharge: HOME | End: 2022-06-12
Payer: COMMERCIAL
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
## 2022-06-11 DIAGNOSIS — S50.812A: ICD-10-CM

## 2022-06-11 DIAGNOSIS — E66.9: ICD-10-CM

## 2022-06-11 DIAGNOSIS — U07.1: ICD-10-CM

## 2022-06-11 DIAGNOSIS — S82.142A: Primary | ICD-10-CM

## 2022-06-11 PROCEDURE — 87635 SARS-COV-2 COVID-19 AMP PRB: CPT

## 2022-06-11 PROCEDURE — 73560 X-RAY EXAM OF KNEE 1 OR 2: CPT

## 2022-06-11 PROCEDURE — 96372 THER/PROPH/DIAG INJ SC/IM: CPT

## 2022-06-11 PROCEDURE — U0002 COVID-19 LAB TEST NON-CDC: HCPCS

## 2022-06-11 PROCEDURE — 73700 CT LOWER EXTREMITY W/O DYE: CPT

## 2022-06-11 PROCEDURE — 99283 EMERGENCY DEPT VISIT LOW MDM: CPT

## 2022-06-12 VITALS — SYSTOLIC BLOOD PRESSURE: 118 MMHG | DIASTOLIC BLOOD PRESSURE: 62 MMHG | HEART RATE: 97 BPM

## 2023-04-25 ENCOUNTER — HOSPITAL ENCOUNTER (OUTPATIENT)
Dept: HOSPITAL 56 - MW.SDS | Age: 38
Discharge: HOME | End: 2023-04-25
Attending: OBSTETRICS & GYNECOLOGY
Payer: COMMERCIAL

## 2023-04-25 VITALS — DIASTOLIC BLOOD PRESSURE: 78 MMHG | SYSTOLIC BLOOD PRESSURE: 135 MMHG

## 2023-04-25 VITALS — HEART RATE: 74 BPM

## 2023-04-25 DIAGNOSIS — F32.A: ICD-10-CM

## 2023-04-25 DIAGNOSIS — N72: Primary | ICD-10-CM

## 2023-04-25 DIAGNOSIS — Z79.899: ICD-10-CM

## 2023-04-25 DIAGNOSIS — E66.9: ICD-10-CM

## 2023-04-25 DIAGNOSIS — D06.0: ICD-10-CM

## 2023-04-25 DIAGNOSIS — F41.9: ICD-10-CM

## 2023-04-25 DIAGNOSIS — F17.210: ICD-10-CM

## 2023-04-25 LAB
HCT VFR BLD AUTO: 38.6 % (ref 36–46)
HGB BLD-MCNC: 13.4 G/DL (ref 12–16)
MCH RBC QN AUTO: 32.5 PG (ref 27–32)
MCHC RBC AUTO-ENTMCNC: 34.7 G/DL (ref 31–37)
MCHC RBC AUTO-ENTMCNC: 93.7 FL (ref 80–98)
NRBC BLD AUTO-RTO: 0 /100WBC
NRBC BLD AUTO-RTO: 0 K/UL
PLATELET # BLD AUTO: 314 K/UL (ref 150–400)
PMV BLD AUTO: 10.4 FL (ref 7.4–12)
RBC # BLD AUTO: 4.12 M/UL (ref 4.3–5.9)
WBC # BLD AUTO: 8.16 K/UL (ref 4–11)

## 2023-04-25 PROCEDURE — 85027 COMPLETE CBC AUTOMATED: CPT

## 2023-04-25 PROCEDURE — 84703 CHORIONIC GONADOTROPIN ASSAY: CPT

## 2023-04-25 PROCEDURE — 36415 COLL VENOUS BLD VENIPUNCTURE: CPT

## 2023-04-25 PROCEDURE — 57522 CONIZATION OF CERVIX: CPT

## 2025-06-14 ENCOUNTER — HOSPITAL ENCOUNTER (INPATIENT)
Dept: HOSPITAL 56 - MW.OBCHECK | Age: 40
LOS: 1 days | Discharge: HOME | DRG: 560 | End: 2025-06-15
Attending: OBSTETRICS & GYNECOLOGY | Admitting: OBSTETRICS & GYNECOLOGY
Payer: COMMERCIAL

## 2025-06-14 DIAGNOSIS — Z3A.39: ICD-10-CM

## 2025-06-14 LAB
HCT VFR BLD AUTO: 29.5 % (ref 37–47)
HGB BLD-MCNC: 10.2 G/DL (ref 12–16)
IMM GRANULOCYTES # BLD: (no result) K/UL (ref 0–0.05)
IMM GRANULOCYTES NFR BLD: (no result) % (ref 0–0.4)
MCH RBC QN AUTO: 32.9 PG (ref 28–32)
MCHC RBC AUTO-ENTMCNC: 34.6 G/DL (ref 32–36)
MCHC RBC AUTO-ENTMCNC: 95.2 FL (ref 83–99)
NRBC BLD AUTO-RTO: 0 /100WBC (ref 0–0.2)
NRBC BLD AUTO-RTO: 0 K/UL (ref 0–0.02)
PH BLDCOA: 7.08 [PH] (ref 7.18–7.38)
PH BLDCOV: 7.25 [PH] (ref 7.25–7.45)
PLATELET # BLD AUTO: 212 K/UL (ref 150–400)
PMV BLD AUTO: 12.6 FL (ref 9.4–12.3)
RBC # BLD AUTO: 3.1 M/UL (ref 4.1–5.3)
WBC # BLD AUTO: 8.64 K/UL (ref 3.9–11.3)

## 2025-06-14 PROCEDURE — 00HU33Z INSERTION OF INFUSION DEVICE INTO SPINAL CANAL, PERCUTANEOUS APPROACH: ICD-10-PCS | Performed by: OBSTETRICS & GYNECOLOGY

## 2025-06-14 PROCEDURE — 3E033VJ INTRODUCTION OF OTHER HORMONE INTO PERIPHERAL VEIN, PERCUTANEOUS APPROACH: ICD-10-PCS | Performed by: OBSTETRICS & GYNECOLOGY

## 2025-06-14 PROCEDURE — 3E0R3BZ INTRODUCTION OF ANESTHETIC AGENT INTO SPINAL CANAL, PERCUTANEOUS APPROACH: ICD-10-PCS | Performed by: OBSTETRICS & GYNECOLOGY

## 2025-06-14 RX ADMIN — SODIUM CHLORIDE, SODIUM LACTATE, POTASSIUM CHLORIDE, AND CALCIUM CHLORIDE SCH MLS/HR: .6; .31; .03; .02 INJECTION, SOLUTION INTRAVENOUS at 08:35

## 2025-06-14 RX ADMIN — ONDANSETRON PRN MG: 2 INJECTION, SOLUTION INTRAMUSCULAR; INTRAVENOUS at 14:50

## 2025-06-14 RX ADMIN — Medication SCH MLS/HR: at 15:18

## 2025-06-14 RX ADMIN — ROPIVACAINE HYDROCHLORIDE SCH MLS/HR: 2 INJECTION, SOLUTION EPIDURAL; INFILTRATION at 13:52

## 2025-06-14 RX ADMIN — Medication SCH MLS/HR: at 09:42

## 2025-06-15 VITALS — DIASTOLIC BLOOD PRESSURE: 67 MMHG | SYSTOLIC BLOOD PRESSURE: 124 MMHG | HEART RATE: 74 BPM

## 2025-06-15 LAB
HCT VFR BLD AUTO: 30.1 % (ref 37–47)
HGB BLD-MCNC: 10.3 G/DL (ref 12–16)

## 2025-06-15 RX ADMIN — IBUPROFEN PRN MG: 800 TABLET, FILM COATED ORAL at 01:06
